# Patient Record
(demographics unavailable — no encounter records)

---

## 2024-11-29 NOTE — DISCUSSION/SUMMARY
[FreeTextEntry1] : Vaccine Information Sheet(s) given for appropriate vaccines. The components of the vaccine(s) to be administered today are listed in the plan of care. We discussed common side effects and education on the vaccine was provided including the disease(s) for which the vaccine(s) are intended to prevent as well as any risks. Denies any questions. Consent was given to vaccinate. Flu given. [] : The components of the vaccine(s) to be administered today are listed in the plan of care. The disease(s) for which the vaccine(s) are intended to prevent and the risks have been discussed with the caretaker.  The risks are also included in the appropriate vaccination information statements which have been provided to the patient's caregiver.  The caregiver has given consent to vaccinate.

## 2025-07-30 NOTE — HISTORY OF PRESENT ILLNESS
[No] : Patient has not had sexual intercourse. [HIV Screening Declined] : HIV Screening Declined [Yes] : Patient goes to dentist yearly [Needs Immunizations] : Needs immunizations [LMP: _____] : LMP: [unfilled] [Days of Bleeding: _____] : Days of bleeding: [unfilled] [Age of Menarche: ____] : Age of Menarche: [unfilled] [Irregular menses] : irregular menses [Heavy Bleeding] : heavy bleeding [Painful Cramps] : painful cramps [Tampon Use] : no tampon use [FreeTextEntry7] : Irregular menses since September 2024 (Her period was coming every 23-40 days). It was irregular but she still had her menses every 1-2 months. She had her last menses started on May 23rd which lasted for 6 days. That was shorter than the other periods which were lasting 10+ days.  She has never seen a GYN but has an appt next week. No excessive hair growth. She has more frequent headaches on and off since Jan-Feb 2025. She denies any stomach pain, vomiting. She sometimes has been nauseous [de-identified] : Irregular menses in the family. [de-identified] : TDAP [FreeTextEntry8] : Her menses were regular until last year [de-identified] : Denies sexually active; interested in males; no boyfriend currently.

## 2025-07-30 NOTE — PLAN
[TextEntry] : 1) Irregular menses- she has an appt with [facility] in [place] and she found about that from her Mom who works there as a nurse. Screening labs ordered for irregular menses URINE testing with urine pregnancy and POCT urine done as well. 2) TDAP given 3) Transition to adult medicine 4) Scoliosis- follow up with ORTHO needed OK to call parent (Mom with result per pt) PT phone is [phone]

## 2025-07-30 NOTE — PHYSICAL EXAM

## 2025-07-30 NOTE — RISK ASSESSMENT
[Little interest or pleasure doing things] : 1) Little interest or pleasure doing things [Feeling down, depressed, or hopeless] : 2) Feeling down, depressed, or hopeless [0] : 2) Feeling down, depressed, or hopeless: Not at all (0) [PHQ-2 Negative - No further assessment needed] : PHQ-2 Negative - No further assessment needed [I have developed a follow-up plan documented below in the note.] : I have developed a follow-up plan documented below in the note. [No Increased risk of SCA or SCD] : No Increased risk of SCA or SCD    [No] : Patient does not consent to screening. [[id number]] : 0 [Have you ever fainted, passed out or had an unexplained seizure suddenly and without warning, especially during exercise or in response] : Have you ever fainted, passed out or had an unexplained seizure suddenly and without warning, especially during exercise or in response to sudden loud noises such as doorbells, alarm clocks and ringing telephones? No [Have you ever had exercise-related chest pain or shortness of breath?] : Have you ever had exercise-related chest pain or shortness of breath? No [Has anyone in your immediate family (parents, grandparents, siblings) or other more distant relatives (aunts, uncles, cousins)  of heart] : Has anyone in your immediate family (parents, grandparents, siblings) or other more distant relatives (aunts, uncles, cousins)  of heart problems or had an unexpected sudden death before age 50 (This would include unexpected drownings, unexplained car accidents in which the relative was driving or sudden infant death syndrome.)? No [Are you related to anyone with hypertrophic cardiomyopathy or hypertrophic obstructive cardiomyopathy, Marfan syndrome, arrhythmogenic] : Are you related to anyone with hypertrophic cardiomyopathy or hypertrophic obstructive cardiomyopathy, Marfan syndrome, arrhythmogenic right ventricular cardiomyopathy, long QT syndrome, short QT syndrome, Brugada syndrome or catecholaminergic polymorphic ventricular tachycardia, or anyone younger than 50 years with a pacemaker or implantable defibrillator? No

## 2025-07-30 NOTE — DISCUSSION/SUMMARY
[Initiated discussion about transfer to an adult healthcare provider?] : Initiated discussion about transfer to an adult healthcare provider? Yes  [Discussed choices for adult care and assist in identifying possible care providers?] : Discussed choices for adult care and assist in identifying possible care providers? Yes

## 2025-07-30 NOTE — HISTORY OF PRESENT ILLNESS
[No] : Patient has not had sexual intercourse. [HIV Screening Declined] : HIV Screening Declined [Yes] : Patient goes to dentist yearly [Needs Immunizations] : Needs immunizations [LMP: _____] : LMP: [unfilled] [Days of Bleeding: _____] : Days of bleeding: [unfilled] [Age of Menarche: ____] : Age of Menarche: [unfilled] [Irregular menses] : irregular menses [Heavy Bleeding] : heavy bleeding [Painful Cramps] : painful cramps [Tampon Use] : no tampon use [FreeTextEntry7] : Irregular menses since September 2024 (Her period was coming every 23-40 days). It was irregular but she still had her menses every 1-2 months. She had her last menses started on May 23rd which lasted for 6 days. That was shorter than the other periods which were lasting 10+ days.  She has never seen a GYN but has an appt next week. No excessive hair growth. She has more frequent headaches on and off since Jan-Feb 2025. She denies any stomach pain, vomiting. She sometimes has been nauseous [de-identified] : Irregular menses in the family. [de-identified] : TDAP [FreeTextEntry8] : Her menses were regular until last year [de-identified] : Denies sexually active; interested in males; no boyfriend currently.

## 2025-07-30 NOTE — PHYSICAL EXAM

## 2025-07-30 NOTE — RISK ASSESSMENT
[Little interest or pleasure doing things] : 1) Little interest or pleasure doing things [Feeling down, depressed, or hopeless] : 2) Feeling down, depressed, or hopeless [0] : 2) Feeling down, depressed, or hopeless: Not at all (0) [PHQ-2 Negative - No further assessment needed] : PHQ-2 Negative - No further assessment needed [I have developed a follow-up plan documented below in the note.] : I have developed a follow-up plan documented below in the note. [No Increased risk of SCA or SCD] : No Increased risk of SCA or SCD    [No] : Patient does not consent to screening. [[id number]] : 0 [Have you ever fainted, passed out or had an unexplained seizure suddenly and without warning, especially during exercise or in response] : Have you ever fainted, passed out or had an unexplained seizure suddenly and without warning, especially during exercise or in response to sudden loud noises such as doorbells, alarm clocks and ringing telephones? No [Have you ever had exercise-related chest pain or shortness of breath?] : Have you ever had exercise-related chest pain or shortness of breath? No [Has anyone in your immediate family (parents, grandparents, siblings) or other more distant relatives (aunts, uncles, cousins)  of heart] : Has anyone in your immediate family (parents, grandparents, siblings) or other more distant relatives (aunts, uncles, cousins)  of heart problems or had an unexpected sudden death before age 50 (This would include unexpected drownings, unexplained car accidents in which the relative was driving or sudden infant death syndrome.)? No [Are you related to anyone with hypertrophic cardiomyopathy or hypertrophic obstructive cardiomyopathy, Marfan syndrome, arrhythmogenic] : Are you related to anyone with hypertrophic cardiomyopathy or hypertrophic obstructive cardiomyopathy, Marfan syndrome, arrhythmogenic right ventricular cardiomyopathy, long QT syndrome, short QT syndrome, Brugada syndrome or catecholaminergic polymorphic ventricular tachycardia, or anyone younger than 50 years with a pacemaker or implantable defibrillator? No

## 2025-07-30 NOTE — PLAN
[TextEntry] : 1) Irregular menses- she has an appt with [facility]" in [place] and she found about that from her Mom who works there as a nurse. Screening labs ordered for irregular menses URINE testing with urine pregnancy and POCT urine done as well. 2) TDAP given 3) Transition to adult medicine 4) Scoliosis- follow up with ORTHO needed OK to call parent (Mom with result per pt) PT phone is [phone]